# Patient Record
Sex: MALE | Race: WHITE | Employment: STUDENT | URBAN - METROPOLITAN AREA
[De-identification: names, ages, dates, MRNs, and addresses within clinical notes are randomized per-mention and may not be internally consistent; named-entity substitution may affect disease eponyms.]

---

## 2024-03-22 ENCOUNTER — HOSPITAL ENCOUNTER (OUTPATIENT)
Dept: PHYSICAL THERAPY | Age: 24
Setting detail: RECURRING SERIES
Discharge: HOME | End: 2024-03-25
Payer: COMMERCIAL

## 2024-03-22 DIAGNOSIS — R29.898 SHOULDER WEAKNESS: Primary | ICD-10-CM

## 2024-03-22 DIAGNOSIS — M25.611 STIFFNESS OF RIGHT SHOULDER, NOT ELSEWHERE CLASSIFIED: ICD-10-CM

## 2024-03-22 PROCEDURE — 97161 PT EVAL LOW COMPLEX 20 MIN: CPT

## 2024-03-22 PROCEDURE — 97110 THERAPEUTIC EXERCISES: CPT

## 2024-03-22 ASSESSMENT — PAIN SCALES - GENERAL: PAINLEVEL_OUTOF10: 0

## 2024-03-22 NOTE — PROGRESS NOTES
3/26/2024  2:45 PM Bradley East, PT [facility]   3/29/2024 10:15 AM Bradley East, PT [facility]   4/2/2024  2:45 PM Bradley East, PT [facility]   4/5/2024  9:30 AM Bradley East, PT [facility]   4/9/2024  2:45 PM Bradley East, PT [facility]   4/12/2024 10:15 AM Bradley East, PT [facility]   4/16/2024  2:45 PM Bradley East, PT [facility]   4/23/2024  2:45 PM Bradley East, PT [facility]   4/26/2024 10:15 AM Bradley East, PT [facility]   4/30/2024  2:45 PM Bradley East, PT [facility]   5/3/2024 10:15 AM Bradley East, PT [facility]

## 2024-03-26 ENCOUNTER — HOSPITAL ENCOUNTER (OUTPATIENT)
Dept: PHYSICAL THERAPY | Age: 24
Setting detail: RECURRING SERIES
Discharge: HOME | End: 2024-03-29
Payer: COMMERCIAL

## 2024-03-26 PROCEDURE — 97110 THERAPEUTIC EXERCISES: CPT

## 2024-03-26 ASSESSMENT — PAIN SCALES - GENERAL: PAINLEVEL_OUTOF10: 0

## 2024-03-26 NOTE — PROGRESS NOTES
Anchored Resistance  - 1 x daily - 7 x weekly - 3 sets - 10 reps  - Single Arm Shoulder Extension with Anchored Resistance  - 1 x daily - 7 x weekly - 3 sets - 10 reps    Treatment/Session Summary:    Treatment Assessment:   Progress made already - shoulder active flexion today improved to 150 deg    Communication/Consultation:  None today   Equipment provided today:  HEP  Recommendations/Intent for next treatment session: Next visit will focus on R scapulothoracic and shoulder strength in protected arm positions.    >Total Treatment Billable Duration:  40 minutes   Time In: 1450  Time Out: 1530    Bradley East, PT         Charge Capture  [employer] Portal  Appt Desk     Future Appointments   Date Time Provider Department Center   3/29/2024 10:15 AM Bradley East, PT [facility]   4/2/2024  2:45 PM Bradley East, PT [facility]   4/5/2024  9:30 AM Bradley East, PT [facility]   4/9/2024  2:45 PM Bradley East, PT [facility]   4/12/2024 10:15 AM Bradley East, PT [facility]   4/16/2024  2:45 PM Bradley East, PT [facility]   4/23/2024  2:45 PM Bradley East, PT [facility]   4/26/2024 10:15 AM Bradley East, PT [facility]   4/30/2024  2:45 PM Bradley East, PT [facility]   5/3/2024 10:15 AM Bradley East, PT [facility]

## 2024-03-29 ENCOUNTER — HOSPITAL ENCOUNTER (OUTPATIENT)
Dept: PHYSICAL THERAPY | Age: 24
Setting detail: RECURRING SERIES
End: 2024-03-29
Payer: COMMERCIAL

## 2024-03-29 NOTE — PROGRESS NOTES
Moises Nesbitt  : 2000  Primary: [employer]  Secondary:  [facility] [place]  [place] [address]  Phone: [phone]  Fax: [phone]    PT Visit Info:    No data recorded   OT Visit Info:  No data recorded    OUTPATIENT THERAPY: 3/29/2024  Episode  Appt Desk        Moises Nesbitt cancelled his appointment for today due to  holiday travel .  Will plan to follow up next during next appointment.  Thank you,  Bradley East, PT    Future Appointments   Date Time Provider Department Center   3/29/2024  7:00 PM Bradley East, PT [facility]   2024  2:45 PM Bradley East, PT [facility]   2024  9:30 AM Bradley East, PT [facility]   2024  2:45 PM Bradley East, PT [facility]   2024 10:15 AM Bradley East, PT [facility]   2024  2:45 PM Bradley East, PT [facility]   2024  2:45 PM Bradley East, PT [facility]   2024 10:15 AM Bradley East, PT [facility]   2024  2:45 PM Bradley East, PT [facility]   5/3/2024 10:15 AM Bradley East, PT [facility]

## 2024-04-02 ENCOUNTER — HOSPITAL ENCOUNTER (OUTPATIENT)
Dept: PHYSICAL THERAPY | Age: 24
Setting detail: RECURRING SERIES
Discharge: HOME | End: 2024-04-05
Payer: COMMERCIAL

## 2024-04-02 PROCEDURE — 97110 THERAPEUTIC EXERCISES: CPT

## 2024-04-02 ASSESSMENT — PAIN SCALES - GENERAL: PAINLEVEL_OUTOF10: 0

## 2024-04-02 NOTE — PROGRESS NOTES
Moises Nesbitt  : 2000  Primary: Samantha Sethi (Ludwin PEREZ)  Secondary:  [facility] [place]  [place] [address]  Phone: [phone]  Fax: [phone] Plan Frequency: 1x to 3x/week    Plan of Care/Certification Expiration Date: 24        Plan of Care/Certification Expiration Date:  Plan of Care/Certification Expiration Date: 24    Frequency/Duration:   Plan Frequency: 1x to 3x/week      Time In/Out:   Time In: 1450  Time Out: 1530      PT Visit Info:         Visit Count:  3    OUTPATIENT PHYSICAL THERAPY:   Treatment Note 2024       Episode  (PT - Right shoulder pain)               Treatment Diagnosis:    Shoulder weakness  Stiffness of right shoulder, not elsewhere classified  Medical/Referring Diagnosis:    Stiffness of right shoulder, not elsewhere classified    Referring Physician:  Sangita Kimball APRN - NP  MD Orders:  PT Eval and Treat   Return MD Appt:  TBD   Date of Onset:  Onset Date: 24     Allergies:   Patient has no allergy information on record.  Restrictions/Precautions:   None      Interventions Planned (Treatment may consist of any combination of the following):     See Assessment Note    Subjective Comments:   No pain, shoulder mobility improving.     Initial Pain Level::     0/10  Post Session Pain Level:       0/10  Medications Last Reviewed:  2024  Updated Objective Findings:  Findings from initial evaluation unless otherwise noted:  Observation  Mesomorphic build male, no interscapular, deltoid or posterior cuff atrophy noted, neutral upper quarter posture    ROM (R/L in °) AROM(PROM)  Shoulder flexion  163 deg/141 deg (175 deg)   Shoulder abduction 183 deg /122 deg  Shoulder ER               118 deg /102 deg  Shoulder IR   75 deg /90 deg   Cervical AROM: full all planes L/R      Strength (R/L)   Shoulder flexion  25kg/22 kg  Shoulder ER   17kg/16.4kg  Shoulder IR   22kg/25kg    Elbow flexion   5/5  /  5/5    Special

## 2024-04-05 ENCOUNTER — HOSPITAL ENCOUNTER (OUTPATIENT)
Dept: PHYSICAL THERAPY | Age: 24
Setting detail: RECURRING SERIES
End: 2024-04-05
Payer: COMMERCIAL

## 2024-04-05 NOTE — PROGRESS NOTES
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  Will plan to follow up next during next appointment.  Thank you,  Bradley East, PT    Future Appointments   Date Time Provider Department Center   2024  2:45 PM Bradley East, PT [facility]   2024 10:15 AM Bradley East, PT [facility]   2024  2:45 PM Bradley East, PT [facility]   2024  2:45 PM Bradley East, PT [facility]   2024 10:15 AM Bradley East, PT [facility]   2024  2:45 PM Bradley East, PT [facility]   5/3/2024 10:15 AM Bradley East, PT [facility]

## 2024-04-09 ENCOUNTER — HOSPITAL ENCOUNTER (OUTPATIENT)
Dept: PHYSICAL THERAPY | Age: 24
Setting detail: RECURRING SERIES
Discharge: HOME | End: 2024-04-12
Payer: COMMERCIAL

## 2024-04-09 PROCEDURE — 97110 THERAPEUTIC EXERCISES: CPT

## 2024-04-09 ASSESSMENT — PAIN SCALES - GENERAL: PAINLEVEL_OUTOF10: 0

## 2024-04-09 NOTE — PROGRESS NOTES
Moises Nesbitt  : 2000  Primary: Samantha Sethi (Ludwin PEREZ)  Secondary:  [facility] [place]  [place] [address]  Phone: [phone]  Fax: [phone] Plan Frequency: 1x to 3x/week    Plan of Care/Certification Expiration Date: 24        Plan of Care/Certification Expiration Date:  Plan of Care/Certification Expiration Date: 24    Frequency/Duration:   Plan Frequency: 1x to 3x/week      Time In/Out:   Time In: 1445  Time Out: 1527      PT Visit Info:         Visit Count:  4    OUTPATIENT PHYSICAL THERAPY:   Treatment Note 2024       Episode  (PT - Right shoulder pain)               Treatment Diagnosis:    Shoulder weakness  Stiffness of right shoulder, not elsewhere classified  Medical/Referring Diagnosis:    Stiffness of right shoulder, not elsewhere classified    Referring Physician:  Sangita Kimball APRN - NP  MD Orders:  PT Eval and Treat   Return MD Appt:  TBD   Date of Onset:  Onset Date: 24     Allergies:   Patient has no allergy information on record.  Restrictions/Precautions:   None      Interventions Planned (Treatment may consist of any combination of the following):     See Assessment Note    Subjective Comments:   Moises demonstrating full active overhead reach at start of today's appointment.     Initial Pain Level::     0/10  Post Session Pain Level:       0/10  Medications Last Reviewed:  2024  Updated Objective Findings:  Findings from initial evaluation unless otherwise noted:  Observation  Mesomorphic build male, no interscapular, deltoid or posterior cuff atrophy noted, neutral upper quarter posture    ROM (R/L in °) AROM(PROM)  Shoulder flexion  163 deg/141 deg (175 deg)   Shoulder abduction 183 deg /122 deg  Shoulder ER               118 deg /102 deg  Shoulder IR   75 deg /90 deg   Cervical AROM: full all planes L/R      Strength (R/L)   Shoulder flexion  25kg/22 kg  Shoulder ER   17kg/16.4kg  Shoulder IR

## 2024-04-12 ENCOUNTER — HOSPITAL ENCOUNTER (OUTPATIENT)
Dept: PHYSICAL THERAPY | Age: 24
Setting detail: RECURRING SERIES
Discharge: HOME | End: 2024-04-15
Payer: COMMERCIAL

## 2024-04-12 PROCEDURE — 97110 THERAPEUTIC EXERCISES: CPT

## 2024-04-12 ASSESSMENT — PAIN SCALES - GENERAL: PAINLEVEL_OUTOF10: 0

## 2024-04-12 NOTE — PROGRESS NOTES
green tubing doubled x 30  Supine saw circles: 3.5# medball: cw/ccw  Prone R saw horiz abd 3# x 40  BOSU pushups: 4 x 10      Shoulder W' banded ER walking : 4 x 130' Prone R saw flexion 3# 4 x 10   BOSU pushup position hold w/steering tennis ball to center of bosu: 2 min     Shoulder shrug carry w/ 15#: 4 x 65' each L/R Prone push up position alternating shoulder touches3 x 10  Oscillating wand saw flexion/extn 4 x 10      Wall slide flexion x 10 reps (end range stretch)  Prone pushups : 3 x 10        Mid range saw flexion ball rolling at wall 2#, 4 x 10 each cw/ccw.  20# single side carry w/ arm at side: 6 x 50' each L/R       Saw extn 25# 3 x 10 (cable wt)  Double arm shoulder flexion slides at wall w/ green band loop around hands 4 x 12 rep             Proprioceptive Activities:                                Manual Therapy:                        Functional Activities:                                          Access Code: [email or web address]  URL: [email or web address]  Date: 03/22/2024  Prepared by: Bradley East    Exercises  - Supine Shoulder Flexion Extension AAROM with Dowel  - 1 x daily - 7 x weekly - 10 reps - 10 second hold  - Standing shoulder flexion wall slides  - 1 x daily - 7 x weekly - 5 sets - 10 reps  - Seated Shoulder Shrug Circles AROM Backward  - 1 x daily - 7 x weekly - 30 reps  - Shoulder External Rotation with Anchored Resistance  - 1 x daily - 7 x weekly - 3 sets - 10 reps  - Standing Shoulder Internal Rotation with Anchored Resistance  - 1 x daily - 7 x weekly - 3 sets - 10 reps  - Single Arm Shoulder Extension with Anchored Resistance  - 1 x daily - 7 x weekly - 3 sets - 10 reps    Treatment/Session Summary:    Treatment Assessment:   Shoulder winging has moderated over the last two visits - very strong performance. Pt advised to carefully ease back into lifting activities and lightweight rucksack carrying.     Communication/Consultation:  None today   Equipment provided today:

## 2024-04-16 ENCOUNTER — HOSPITAL ENCOUNTER (OUTPATIENT)
Dept: PHYSICAL THERAPY | Age: 24
Setting detail: RECURRING SERIES
Discharge: HOME | End: 2024-04-19
Payer: COMMERCIAL

## 2024-04-16 PROCEDURE — 97110 THERAPEUTIC EXERCISES: CPT

## 2024-04-16 ASSESSMENT — PAIN SCALES - GENERAL: PAINLEVEL_OUTOF10: 0

## 2024-04-16 NOTE — PROGRESS NOTES
second hold  - Standing shoulder flexion wall slides  - 1 x daily - 7 x weekly - 5 sets - 10 reps  - Seated Shoulder Shrug Circles AROM Backward  - 1 x daily - 7 x weekly - 30 reps  - Shoulder External Rotation with Anchored Resistance  - 1 x daily - 7 x weekly - 3 sets - 10 reps  - Standing Shoulder Internal Rotation with Anchored Resistance  - 1 x daily - 7 x weekly - 3 sets - 10 reps  - Single Arm Shoulder Extension with Anchored Resistance  - 1 x daily - 7 x weekly - 3 sets - 10 reps    Treatment/Session Summary:    Treatment Assessment:   Minimal scapular winging now, able to perform today's program with good technique w/ all exercises. 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     Communication/Consultation:  None today   Equipment provided today:  HEP  Recommendations/Intent for next treatment session: Hold PT - re-assess after next week as needed.     >Total Treatment Billable Duration:  38  minutes   Time In: 1445  Time Out: 1524    Bradley East PT         Charge Capture  [employer] Portal  Appt Desk     Future Appointments   Date Time Provider Department Center   4/23/2024  7:00 PM Bradley East, PT [facility]   4/26/2024  7:00 PM Bradley East, PT [facility]   4/30/2024  2:45 PM Bradley East, PT [facility]   5/3/2024 10:15 AM Bradley East, PT [facility]

## 2024-04-23 ENCOUNTER — HOSPITAL ENCOUNTER (OUTPATIENT)
Dept: PHYSICAL THERAPY | Age: 24
Setting detail: RECURRING SERIES
End: 2024-04-23
Payer: COMMERCIAL

## 2024-04-23 NOTE — PROGRESS NOTES
Moises Nesbitt  : 2000  Primary: [employer]  Secondary:  [facility] [place] [address]  Phone: [phone]  Fax: [phone]    PT Visit Info:    No data recorded   OT Visit Info:  No data recorded    OUTPATIENT THERAPY: 2024  Episode  Appt Desk        Moises Nesbitt cancelled his appointment for today due to  school - related travel .  Will plan to follow up next during next appointment.  Thank you,  Bradley East, PT    Future Appointments   Date Time Provider Department Center   2024  7:00 PM Bradley East, PT [facility]   2024  7:00 PM Bradley East, PT [facility]   2024  2:45 PM Bradley East, PT [facility]   5/3/2024 10:15 AM Bradley East, PT [facility]

## 2024-04-26 ENCOUNTER — HOSPITAL ENCOUNTER (OUTPATIENT)
Dept: PHYSICAL THERAPY | Age: 24
Setting detail: RECURRING SERIES
End: 2024-04-26
Payer: COMMERCIAL

## 2024-04-26 NOTE — PROGRESS NOTES
Moises Nesbitt  : 2000  Primary: Samantha Sc  Secondary:  [facility] [place] [facility] [place] [address]  Phone: [phone]  Fax: [phone]    PT Visit Info:    No data recorded   OT Visit Info:  No data recorded    OUTPATIENT THERAPY: 2024  Episode  Appt Desk        Moises Nesbitt cancelled his appointment for today due to  school - related travel .  Will plan to follow up next during next appointment.  Thank you,  Bradley East, PT    Future Appointments   Date Time Provider Department Center   2024  7:00 PM Bradley East, PT [facility]   2024  2:45 PM Bradley East, PT [facility]   5/3/2024 10:15 AM Bradley East, PT [facility]